# Patient Record
Sex: FEMALE | Race: WHITE
[De-identification: names, ages, dates, MRNs, and addresses within clinical notes are randomized per-mention and may not be internally consistent; named-entity substitution may affect disease eponyms.]

---

## 2019-10-29 ENCOUNTER — HOSPITAL ENCOUNTER (EMERGENCY)
Dept: HOSPITAL 25 - ED | Age: 44
Discharge: HOME | End: 2019-10-29
Payer: COMMERCIAL

## 2019-10-29 VITALS — SYSTOLIC BLOOD PRESSURE: 144 MMHG | DIASTOLIC BLOOD PRESSURE: 87 MMHG

## 2019-10-29 DIAGNOSIS — Z88.1: ICD-10-CM

## 2019-10-29 DIAGNOSIS — R31.9: Primary | ICD-10-CM

## 2019-10-29 DIAGNOSIS — Z79.899: ICD-10-CM

## 2019-10-29 DIAGNOSIS — Z88.2: ICD-10-CM

## 2019-10-29 DIAGNOSIS — D25.9: ICD-10-CM

## 2019-10-29 DIAGNOSIS — Z88.0: ICD-10-CM

## 2019-10-29 DIAGNOSIS — F17.210: ICD-10-CM

## 2019-10-29 DIAGNOSIS — Z87.442: ICD-10-CM

## 2019-10-29 DIAGNOSIS — N83.201: ICD-10-CM

## 2019-10-29 LAB
ALBUMIN SERPL BCG-MCNC: 3.8 G/DL (ref 3.2–5.2)
ALBUMIN/GLOB SERPL: 1.2 {RATIO} (ref 1–3)
ALP SERPL-CCNC: 53 U/L (ref 34–104)
ALT SERPL W P-5'-P-CCNC: 15 U/L (ref 7–52)
ANION GAP SERPL CALC-SCNC: 7 MMOL/L (ref 2–11)
AST SERPL-CCNC: 20 U/L (ref 13–39)
BASOPHILS # BLD AUTO: 0.2 10^3/UL (ref 0–0.2)
BUN SERPL-MCNC: 13 MG/DL (ref 6–24)
BUN/CREAT SERPL: 17.3 (ref 8–20)
CALCIUM SERPL-MCNC: 8.9 MG/DL (ref 8.6–10.3)
CHLORIDE SERPL-SCNC: 99 MMOL/L (ref 101–111)
EOSINOPHIL # BLD AUTO: 0.1 10^3/UL (ref 0–0.6)
GLOBULIN SER CALC-MCNC: 3.1 G/DL (ref 2–4)
GLUCOSE SERPL-MCNC: 85 MG/DL (ref 70–100)
HCG SERPL QL: < 0.6 MIU/ML
HCO3 SERPL-SCNC: 27 MMOL/L (ref 22–32)
HCT VFR BLD AUTO: 41 % (ref 35–47)
HGB BLD-MCNC: 13.9 G/DL (ref 12–16)
LYMPHOCYTES # BLD AUTO: 2.8 10^3/UL (ref 1–4.8)
MCH RBC QN AUTO: 30 PG (ref 27–31)
MCHC RBC AUTO-ENTMCNC: 34 G/DL (ref 31–36)
MCV RBC AUTO: 89 FL (ref 80–97)
MONOCYTES # BLD AUTO: 0.7 10^3/UL (ref 0–0.8)
NEUTROPHILS # BLD AUTO: 7.3 10^3/UL (ref 1.5–7.7)
NRBC # BLD AUTO: 0 10^3/UL
NRBC BLD QL AUTO: 0.2
PLATELET # BLD AUTO: 288 10^3/UL (ref 150–450)
POTASSIUM SERPL-SCNC: 3.3 MMOL/L (ref 3.5–5)
PROT SERPL-MCNC: 6.9 G/DL (ref 6.4–8.9)
RBC # BLD AUTO: 4.61 10^6 /UL (ref 3.7–4.87)
RBC UR QL AUTO: (no result)
SODIUM SERPL-SCNC: 133 MMOL/L (ref 135–145)
WBC # BLD AUTO: 11 10^3/UL (ref 3.5–10.8)

## 2019-10-29 PROCEDURE — 80053 COMPREHEN METABOLIC PANEL: CPT

## 2019-10-29 PROCEDURE — 36415 COLL VENOUS BLD VENIPUNCTURE: CPT

## 2019-10-29 PROCEDURE — 84702 CHORIONIC GONADOTROPIN TEST: CPT

## 2019-10-29 PROCEDURE — 87077 CULTURE AEROBIC IDENTIFY: CPT

## 2019-10-29 PROCEDURE — 81015 MICROSCOPIC EXAM OF URINE: CPT

## 2019-10-29 PROCEDURE — 74177 CT ABD & PELVIS W/CONTRAST: CPT

## 2019-10-29 PROCEDURE — 81003 URINALYSIS AUTO W/O SCOPE: CPT

## 2019-10-29 PROCEDURE — 99282 EMERGENCY DEPT VISIT SF MDM: CPT

## 2019-10-29 PROCEDURE — 85025 COMPLETE CBC W/AUTO DIFF WBC: CPT

## 2019-10-29 PROCEDURE — 87086 URINE CULTURE/COLONY COUNT: CPT

## 2019-10-29 NOTE — ED
Progress





- Progress Note


Progress Note: 





This pt is a sign out to Dr. Palacios from Dr. Yates at shift change 2200 10/

29/19 pending an interpretation of her A/P CT and disposition. 





Course/Dx





- Course


Course Of Treatment: This pt is a sign out to Dr. Palacios from Dr. Yates at 

shift change 2200 10/29/19 pending an interpretation of her A/P CT and 

disposition.





- Diagnoses


Provider Diagnoses: 


 Hematuria








Discharge ED





- Discharge Plan


Referrals: 


Renu Espino MD [Primary Care Provider] - 





- Attestation Statements


Document Initiated by Scribe: Yes


Documenting Scribe: Manuel Rico


Provider For Whom Scribe is Documenting (Include Credential): Jordana Palacios MD 


Scribe Attestation: 


Manuel PERRY, scribed for Jordana Palacios MD  on 10/29/19 at 2203.

## 2019-10-29 NOTE — ED
GI/ HPI





- HPI Summary


HPI Summary: 





Pt is a 45 y/o F presenting to the ED with a chief complaint of  issues. Pt 

reports hematuria that has been somewhat intermittent but has now become 

constant and has clots in it. She thought it may have been pre-menopausal and 

initially thought it was vaginal bleeding but it is not. She denies burning 

with urination and fever. She denies trauma (had initially thought she cut 

herself shaving but didnt note any cuts).  She notes some soreness at her 

urethra. She had a kidney stone about 16yrs ago, but nothing since then. Hx 

tobacco use. No hx kidney issues in family. 





- History of Current Complaint


Chief Complaint: EDUrogenitalProblems


Time Seen by Provider: 10/29/19 19:52


Stated Complaint: BLOOD IN URINE


Hx Obtained From: Patient


Onset/Duration: Started Days Ago, Still Present


Timing: Constant, Lasting Days


Severity: Mild


Current Severity: Mild


Pain Intensity: 2


Additional Location for Females: Vulva


Associated Signs and Symptoms: Positive: Hematuria.  Negative: Fever, Other: - 

burning with urination


Aggravating Factor(s): Nothing


Alleviating Factor(s): Nothing





- Allergy/Home Medications


Allergies/Adverse Reactions: 


 Allergies











Allergy/AdvReac Type Severity Reaction Status Date / Time


 


amoxicillin [From Augmentin] Allergy  Unknown Verified 10/29/19 18:35





   Reaction  





   Details  


 


cefaclor [From Ceclor] Allergy  Hives Verified 10/29/19 18:38


 


clavulanic acid Allergy  Unknown Verified 10/29/19 18:35





[From Augmentin]   Reaction  





   Details  


 


Penicillins Allergy  Hives Verified 10/29/19 18:38


 


Sulfa (Sulfonamide Allergy  Hives Verified 10/29/19 18:38





Antibiotics)     











Home Medications: 


 Home Medications





ALPRAZolam TAB* [Xanax TAB*] 0.5 mg PO BID PRN 10/29/19 [History Confirmed 10/29

/19]


Albuterol 2.5MG/3ML (0.083%)* [Ventolin 2.5 MG/3 ML NEB.SOL*] 2.5 mg INH Q6H 

PRN 10/29/19 [History Confirmed 10/29/19]


Budesonide [Pulmicort] 1 mg INH BID 10/29/19 [History Confirmed 10/29/19]


Chlorthalidone TAB* [Hygroton TAB*] 25 mg PO DAILY 10/29/19 [History Confirmed 

10/29/19]


Fluticasone  mcg(NF) [Flovent  Mcg(NF)] 2 puff INH BID 10/29/19 [

History Confirmed 10/29/19]


Mometasone 220 MCG MDI * [Asmanex 220 MCG MDI *] 1 puff INH DAILY 10/29/19 [

History Confirmed 10/29/19]


Montelukast Sodium TAB* [Singulair TAB*] 10 mg PO DAILY 10/29/19 [History 

Confirmed 10/29/19]


Nicotine PATCH 21 MG/24 HR* 21 mg TRANSDERM DAILY 10/29/19 [History Confirmed 10

/29/19]











PMH/Surg Hx/FS Hx/Imm Hx


Previously Healthy: Yes


Endocrine/Hematology History: 


   Denies: Hx Diabetes


Cardiovascular History: 


   Denies: Hx Hypertension


 History: Reports: Hx Kidney Stones


Infectious Disease History: No


Infectious Disease History: 


   Denies: Traveled Outside the [place] in Last 30 Days





- Family History


Known Family History: 


   Negative: Diabetes





- Social History


Alcohol Use: None


Hx Substance Use: No


Substance Use Type: Reports: None


Hx Tobacco Use: Yes


Smoking Status (MU): Light Every Day Tobacco Smoker





Review of Systems


Negative: Fever


Positive: burning, hematuria, other - pain at urethra


All Other Systems Reviewed And Are Negative: Yes





Physical Exam





- Summary


Physical Exam Summary: 





Constitutional: Well-developed, Well-nourished, Alert. (-) Distressed


Skin: Warm, Dry


HENT: Normocephalic; Atraumatic


Eyes: Conjunctiva normal


Neck: Musculoskeletal ROM normal neck. (-) JVD, (-) Stridor, (-) Nuchal rigidity


Cardio: Rhythm regular, rate normal, Heart sounds normal; Intact distal pulses; 

Radial pulses are 2+ and symmetric. (-) Murmur


Pulmonary/Chest wall: Effort normal. (-) Respiratory distress, (-) Wheezes, (-) 

Rales


Abd: Soft, (-) tenderness, (-) Distension, (-) Guarding, (-) Rebound


Musculoskeletal: (-) Edema


Lymph: (-) Cervical adenopathy


Neuro: Alert, Oriented x3


Psych: Mood and affect Normal


: ***





Triage Information Reviewed: Yes


Vital Signs On Initial Exam: 


 Initial Vitals











Temp Pulse Resp BP Pulse Ox


 


 98.1 F   77   18   152/112   99 


 


 10/29/19 18:32  10/29/19 18:32  10/29/19 18:32  10/29/19 18:32  10/29/19 18:32











Vital Signs Reviewed: Yes





Procedures





- Sedation


Patient Received Moderate/Deep Sedation with Procedure: No





Diagnostics





- Vital Signs


 Vital Signs











  Temp Pulse Resp BP Pulse Ox


 


 10/29/19 18:32  98.1 F  77  18  152/112  99














- Laboratory


Lab Results: 


 Lab Results











  10/29/19 Range/Units





  18:49 


 


Urine Color  Red A  


 


Urine Appearance  Turbid  


 


Urine pH  6.0  (5-9)  


 


Ur Specific Gravity  1.015  (1.010-1.030)  


 


Urine Protein  3+(>=500 mg/dl) A  (Negative)  


 


Urine Ketones  Negative  (Negative)  


 


Urine Blood  3+ A  (Negative)  


 


Urine Nitrate  Negative  (Negative)  


 


Urine Bilirubin  Negative  (Negative)  


 


Urine Urobilinogen  Negative  (Negative)  


 


Ur Leukocyte Esterase  Negative  (Negative)  


 


Urine WBC (Auto)  Absent  (Absent)  


 


Urine RBC (Auto)  3+(>10/hpf) A  (Absent)  


 


Urine Bacteria  Absent  (Absent)  


 


Urine Glucose  1+(50 mg/dl) A  (Negative)  











Result Diagrams: 


 10/29/19 20:42





 10/29/19 20:42


Lab Statement: Any lab studies that have been ordered have been reviewed, and 

results considered in the medical decision making process.





- CT


  ** A/P CT


CT Interpretation Completed By: Radiologist


Summary of CT Findings: 1. No CT findings to correlate with patient's 

symptomatology. Specifically no.  obstructing renal or ureteral calculi.  2. 

Right ovarian cyst. Recommend US follow-up in 6-12 weeks.  3. Leiomyomatous 

uterus.  ED physician has reviewed this report.





Re-Evaluation





- Re-Evaluation


  ** First Eval


Re-Evaluation Time: 10:20


Change: Improved - CT neg, d/w patient need for urology outpatient workup. 

Noted R ovarian cyst, patient already aware.





GIGU Course/Dx





- Course


Course Of Treatment: 44-year-old female presents pain with hematuria.  -  

exam w/o abnormality, no abdominal tenderness of flank pain.  - Differential 

includes cystitis, calculus, renal cell carcinoma or carcinoma of the  system

, kidney disease.  Patient is not on menstrual cycle, no vaginal bleeding on 

exam.  We'll check a CT of the abdomen and give urology referral.  - UA w/o wbc 

and does not have signs of infection. Remote hx kidney stones but denies pain





- Diagnoses


Provider Diagnoses: 


 Hematuria








Discharge ED





- Sign-Out/Discharge


Documenting (check all that apply): Patient Departure - discharge 





- Discharge Plan


Condition: Stable


Disposition: HOME


Patient Education Materials:  Hematuria (ED)


Referrals: 


Donis Harvey MD [Medical Doctor] - 


Renu Espino MD [Primary Care Provider] - 


Kurt Hernandez MD [Medical Doctor] - 


Additional Instructions: 


You were seen in the emergency department for hematuria.  Your labs did not 

show a cause for this.  Your CT scan showed a cyst on your right ovary which 

she should follow up with your OBGYN doctor about this is likely not the cause 

of your bleeding.


If any studies were not completed at the time of discharge you will be called 

with the relevant results. Please follow up with urology. 


Please follow up with your primary care doctor in next 2-3 days and return to 

emergency department for worsening pain, bleeding, fevers,  or concerning 

symptoms.


It was a pleasure taking care of you today.





- Billing Disposition and Condition


Condition: STABLE


Disposition: Home





- Attestation Statements


Document Initiated by Scribe: Yes


Documenting Scribe: Manuel Fiore


Provider For Whom Darcyibe is Documenting (Include Credential): Suzanne Yates MD 


Scribe Attestation: 


I, Manuel Fiore, scribed for Suzanne Yates MD  on 10/30/

19 at 1032. 


Scribe Documentation Reviewed: Yes


Provider Attestation: 


The documentation as recorded by the scribeMaria Ines Marco DiSanto 

accurately reflects the service I personally performed and the decisions made 

by Suaznne paz MD 


Status of Scribe Document: Viewed

## 2019-11-01 NOTE — ED
Imaging and Labs Follow Up


Follow Up Type: Labs/Cultures


Labs/Culture Result: 





urine culture final grew staph saprophyticus


Patient Communication/Plan: 





pt was asymptomatic of UTI sxs, except for hematuria


Patient Communication/Plan: 





pt has f/u with urology - will not treat at this time


Provider Diagnoses: 


 Hematuria

## 2019-12-04 NOTE — HP
CC:  Dr. Encarnacion *

 

ADMITTING HISTORY AND PHYSICAL:

 

DATE OF ADMISSION:  12/06/19

 

ADMITTING DIAGNOSES:

1.  Hematuria.

2.  Bladder lesion.

 

SURGICAL PROCEDURE:  Cystoscopy, excision biopsy of bladder lesion.

 

SURGEON:  Dr. Alexis.

 

HISTORY OF PRESENT ILLNESS:  Lubna Flores is a 44-year-old chronic 
smoker who initially had been evaluated in the emergency department for gross 
hematuria. A CT scan done with intravenous contrast at that time had revealed 
normal kidneys and ureters and a subsequent cystoscopy in my office on 10/31/19 
revealed a 3 to 4 cm area at the junction of the posterior wall and dome with 
what appeared to be almost submucosal hemorrhage with overlying clots.  At that 
time, she was given an option of going ahead with a transurethral resection and 
biopsy of the lesion versus waiting and rechecking in a few weeks, which she 
chose and a repeat cystoscopy on 11/14/19 revealed persistent 3 to 

4 cm area at the junction of the posterior wall and dome with irregular 
wrinkled mucosa.  This does not have typical appearance of transitional cell 
bladder tumor, but given the history of smoking and the history of gross 
hematuria and the persistence of an abnormality on cystoscopy, she is now being 
brought in for excision biopsies of this lesion.

 

PAST MEDICAL HISTORY:  Significant for:

1.  Asthma.

2.  PTSD.

3.  History of renal calculi.

 

PAST SURGICAL HISTORY:  Significant for tonsillectomy.

 

MEDICATIONS:  On admission include:

1.  Xanax 0.5 mg p.r.n. anxiety.

2.  Ventolin inhaler.

3.  Chlorthalidone 25 mg a day.

4.  Singulair 1 tablet daily.

5.  Nicotine patch.

6.  Melatonin daily.

7.  Glucosamine chondroitin supplement.

 

ALLERGIES AND INTOLERANCES:  PENICILLIN, AUGMENTIN, CECLOR, and SULFA (hives 
with most and GI upset with AUGMENTIN).

 

SOCIAL HISTORY:  Smoking history:  She is a longstanding smoker with a greater 
than 15-pack-year smoking history.

 

FAMILY HISTORY:  Negative for bladder cancer or urolithiasis.

 

REVIEW OF SYSTEMS:  She is otherwise in excellent health, has no history of 
diabetes mellitus or any other major systemic illness.  She denies any chest 
pain or shortness of breath.  She is moderately anxious.

 

                               PHYSICAL EXAMINATION

 

GENERAL:  Reveals a pleasant healthy-appearing lady.

 

VITAL SIGNS:  Blood pressure is 138/82, pulse 104 per minute and regular, 
temperature 98.4, oxygen saturation 97% on room air.

 

LUNGS:  Clear bilaterally.

 

CARDIOVASCULAR:  Regular rate and rhythm.  S1, S2.

 

ABDOMEN:  Soft without masses.  There is no flank tenderness.

 

 IMPRESSION:  A 44-year-old chronic smoker with history of gross hematuria and 
a persistent bladder lesion as described above.  I have discussed the procedure 
of cystoscopy and excision biopsies of bladder lesion and possible risks 
including bleeding, infection, bladder injury, and she appears to understand 
and wishes to proceed as planned.

 

PLAN:  Plan is for cystoscopy and excision biopsies of bladder lesion.

 

 

 

[id number]/CPS #: [id number]

MTDD

## 2019-12-06 ENCOUNTER — HOSPITAL ENCOUNTER (OUTPATIENT)
Dept: HOSPITAL 25 - OR | Age: 44
Discharge: HOME | End: 2019-12-06
Attending: UROLOGY
Payer: COMMERCIAL

## 2019-12-06 VITALS — SYSTOLIC BLOOD PRESSURE: 144 MMHG | DIASTOLIC BLOOD PRESSURE: 95 MMHG

## 2019-12-06 DIAGNOSIS — N32.89: Primary | ICD-10-CM

## 2019-12-06 DIAGNOSIS — R31.0: ICD-10-CM

## 2019-12-06 DIAGNOSIS — Z87.442: ICD-10-CM

## 2019-12-06 DIAGNOSIS — J45.909: ICD-10-CM

## 2019-12-06 DIAGNOSIS — F17.210: ICD-10-CM

## 2019-12-06 DIAGNOSIS — F43.10: ICD-10-CM

## 2019-12-06 PROCEDURE — 88341 IMHCHEM/IMCYTCHM EA ADD ANTB: CPT

## 2019-12-06 PROCEDURE — 88305 TISSUE EXAM BY PATHOLOGIST: CPT

## 2019-12-06 PROCEDURE — 88342 IMHCHEM/IMCYTCHM 1ST ANTB: CPT

## 2019-12-06 PROCEDURE — 81025 URINE PREGNANCY TEST: CPT

## 2019-12-06 NOTE — OP
CC:  Dr. Carmen Encarnacion; Dr. Collin Alexis

 

OPERATIVE REPORT:

 

DATE OF OPERATION:  12/06/19

 

DATE OF BIRTH:  [birth year]

 

SURGEON:  Collin Alexis MD.

 

ANESTHESIOLOGIST:  Dr. Shin Anaya.

 

ANESTHESIA:  General.

 

PRE-OP DIAGNOSES:

1.  Hematuria.

2.  Bladder lesion.

 

POST-OP DIAGNOSES:

1.  Hematuria.

2.  Bladder lesion.

 

OPERATIVE PROCEDURES:

1.  Cystoscopy.

2.  Excision, biopsy and fulguration of bladder lesion (3 to 4 cm).

 

COMPLICATIONS:  None.

 

INDICATIONS:  Lubna Flores is a 44-year-old lady who was evaluated and noted to have the abov
e-mentioned persistent findings on cystoscopy.  She is now being brought in for excision and biopsy o
f the bladder lesion.  I have discussed the procedure in detail including possible risks of bleeding,
 infection, bladder perforation and she appears to understand and wishes to proceed as planned.

 

OPERATIVE FINDINGS:  Approximately 3 cm area posterior bladder wall with raised, slightly hyperemic m
ucosa (probable inflammatory, less likely neoplasm).

 

POSTOPERATIVE CONDITION:  Stable.

 

ESTIMATED BLOOD LOSS:  Minimal.

 

DESCRIPTION OF PROCEDURE:  After induction of general anesthesia, the patient was placed in dorsal li
thotomy position.  Sequential compression devices were in place and functioning.  Initial cystoscopy 
revealed normally located right and left ureteral orifices with clear efflux noted.  There were some 
mild chronic inflammatory changes noted in the floor of the bladder.

 

In the posterior bladder wall, there was an approximately 3 cm area where the mucosa was raised and h
yperemic.  The remainder of the bladder was unremarkable. There was some evidence of extrinsic pressu
re on the posterior bladder wall, possibly related to the uterus.

 

Excision biopsies were performed and the lesion was sent for histopathology.  Next, the electrocauter
y [place] was used to carefully cauterize the edges and base of the lesion.  At the end of the procedu
re, hemostasis appeared satisfactory and there was no evidence of bladder perforation.  A 20-[place] F
oley catheter was placed for bladder drainage.  The patient tolerated the procedure satisfactorily an
d was transferred back to the recovery area in stable condition.

 

 [id number]/CPS #: [id number]